# Patient Record
Sex: FEMALE | Race: WHITE | NOT HISPANIC OR LATINO | URBAN - METROPOLITAN AREA
[De-identification: names, ages, dates, MRNs, and addresses within clinical notes are randomized per-mention and may not be internally consistent; named-entity substitution may affect disease eponyms.]

---

## 2017-02-03 ENCOUNTER — IMPORTED ENCOUNTER (OUTPATIENT)
Dept: URBAN - METROPOLITAN AREA CLINIC 31 | Facility: CLINIC | Age: 67
End: 2017-02-03

## 2017-02-03 PROBLEM — H25.813: Noted: 2017-02-03

## 2017-02-03 PROBLEM — H40.053: Noted: 2017-02-03

## 2017-02-03 PROBLEM — H43.813: Noted: 2017-02-03

## 2017-02-03 PROCEDURE — 92014 COMPRE OPH EXAM EST PT 1/>: CPT

## 2017-02-03 PROCEDURE — 92015 DETERMINE REFRACTIVE STATE: CPT

## 2017-02-03 NOTE — PATIENT DISCUSSION
1.  Refractive error Annual 2. Combined Types of Cataract OU: Explained how cataracts can effect vision. Recommend clinical observation. The patient was advised to contact us if any change or worsening of vision. 3. Ocular HTN OU:  Elevated intraocular pressure without signs of glaucomatous damage to the optic nerve. Will continue to monitor. 4. PVD OU:  Patient was cautioned to call our office immediately if they experience a substantial change in their symptoms such as an increase in floaters persistent flashes loss of visual field (may appear as a shadow or a curtain) or decrease in visual acuity as these may indicate a retinal tear or detachment. If this is a new problem patient will need to return for re-examination  as determined by the [address] Return for an appointment in 12 months for comprehensive exam. Pachymetry. with Dr. Sarah Christensen.

## 2018-02-16 ENCOUNTER — IMPORTED ENCOUNTER (OUTPATIENT)
Dept: URBAN - METROPOLITAN AREA CLINIC 31 | Facility: CLINIC | Age: 68
End: 2018-02-16

## 2018-02-16 PROBLEM — H25.813: Noted: 2018-02-16

## 2018-02-16 PROCEDURE — 92014 COMPRE OPH EXAM EST PT 1/>: CPT

## 2018-02-16 NOTE — PATIENT DISCUSSION
1. Combined Types of Cataract OU: Explained how cataracts can effect vision. Recommend clinical observation. The patient was advised to contact us if any change or worsening of vision. 6 mos C eval cats. 2.   Refractive error

## 2018-08-20 ENCOUNTER — IMPORTED ENCOUNTER (OUTPATIENT)
Dept: URBAN - METROPOLITAN AREA CLINIC 31 | Facility: CLINIC | Age: 68
End: 2018-08-20

## 2018-08-20 PROBLEM — H40.053: Noted: 2018-08-20

## 2018-08-20 PROBLEM — H25.813: Noted: 2018-08-20

## 2018-08-20 PROCEDURE — 92250 FUNDUS PHOTOGRAPHY W/I&R: CPT

## 2018-08-20 PROCEDURE — 99214 OFFICE O/P EST MOD 30 MIN: CPT

## 2019-02-22 ENCOUNTER — IMPORTED ENCOUNTER (OUTPATIENT)
Dept: URBAN - METROPOLITAN AREA CLINIC 31 | Facility: CLINIC | Age: 69
End: 2019-02-22

## 2019-02-22 PROBLEM — H25.813: Noted: 2019-02-22

## 2019-02-22 PROCEDURE — 99214 OFFICE O/P EST MOD 30 MIN: CPT

## 2019-02-22 NOTE — PATIENT DISCUSSION
Discussed the risks benefits alternatives and limitations of cataract surgery including infection bleeding loss of vision retinal tears detachment. The patient stated a full understanding and a desire to proceed with the procedure in both eyes. Refractive options were reviewed. Patient has elected to be optimized for distance vision in both eyes. The patient will still need glasses for reading and to possibly fine tune distance vision. Schedule KPE/IOL OS/OD. Ismael Leonard.  DAVID dill

## 2019-03-19 ENCOUNTER — IMPORTED ENCOUNTER (OUTPATIENT)
Dept: URBAN - METROPOLITAN AREA CLINIC 31 | Facility: CLINIC | Age: 69
End: 2019-03-19

## 2019-03-19 PROBLEM — H25.813: Noted: 2019-03-19

## 2019-03-19 PROCEDURE — 76519 ECHO EXAM OF EYE: CPT

## 2019-03-26 ENCOUNTER — IMPORTED ENCOUNTER (OUTPATIENT)
Dept: URBAN - METROPOLITAN AREA CLINIC 31 | Facility: CLINIC | Age: 69
End: 2019-03-26

## 2019-03-26 PROBLEM — Z96.1: Noted: 2019-03-26

## 2019-03-26 PROCEDURE — 99024 POSTOP FOLLOW-UP VISIT: CPT

## 2019-03-26 NOTE — PATIENT DISCUSSION
Post-Op Day #1 - Cataract Surgery Left Eye (OS) - doing well. Tears prn. Continue postop drops as directed. Call office with symptoms of pain redness or decreased vision in operative eye. IOP spike Corneal edema. Possible allergy to Moxifloxacin. Rx Tobramycin QID in replacement of Moxifloxacin. F/U 3 days if vision not cleared or still itching.

## 2019-03-27 ENCOUNTER — IMPORTED ENCOUNTER (OUTPATIENT)
Dept: URBAN - METROPOLITAN AREA CLINIC 31 | Facility: CLINIC | Age: 69
End: 2019-03-27

## 2019-03-27 PROBLEM — Z96.1: Noted: 2019-03-27

## 2019-03-27 PROCEDURE — 99024 POSTOP FOLLOW-UP VISIT: CPT

## 2019-03-27 NOTE — PATIENT DISCUSSION
Pseudophakia OS - Post-Op Day #1 - Cataract Surgery Left Eye (OS) - doing well. Continue po drops as instructed. Call office with symptoms of pain redness or decreased vision left eye.possible mild drop allergy.   CPM.

## 2019-04-02 ENCOUNTER — IMPORTED ENCOUNTER (OUTPATIENT)
Dept: URBAN - METROPOLITAN AREA CLINIC 31 | Facility: CLINIC | Age: 69
End: 2019-04-02

## 2019-04-02 PROBLEM — Z96.1: Noted: 2019-04-02

## 2019-04-02 PROCEDURE — 99024 POSTOP FOLLOW-UP VISIT: CPT

## 2019-04-02 NOTE — PATIENT DISCUSSION
Post-Op Day #1 - Cataract Surgery Right Eye (OD) - doing well. Tears prn. Continue postop drops as directed. Call office with symptoms of pain redness or decreased vision in operative eye. 1 drop zylet instilled. Sample of Cosopt PF to use BID for 3 days.  OD

## 2019-04-02 NOTE — PATIENT DISCUSSION
Pseudophakia OS - Surgery very well healed. Continue to use artificial tears as needed for ocular surface dryness. Finish po drops according to schedule. Final refraction given.

## 2019-04-09 ENCOUNTER — IMPORTED ENCOUNTER (OUTPATIENT)
Dept: URBAN - METROPOLITAN AREA CLINIC 31 | Facility: CLINIC | Age: 69
End: 2019-04-09

## 2019-04-09 PROBLEM — H01.005: Noted: 2019-04-09

## 2019-04-09 PROBLEM — Z96.1: Noted: 2019-04-09

## 2019-04-09 PROBLEM — H01.001: Noted: 2019-04-09

## 2019-04-09 PROBLEM — H01.004: Noted: 2019-04-09

## 2019-04-09 PROBLEM — H01.002: Noted: 2019-04-09

## 2019-04-09 PROCEDURE — 99024 POSTOP FOLLOW-UP VISIT: CPT

## 2019-04-09 PROCEDURE — 92015 DETERMINE REFRACTIVE STATE: CPT

## 2019-04-09 NOTE — PATIENT DISCUSSION
Post-Op Week #2 - Cataract Surgery Left Eye (OS) -  Intraocular lens stable and surgery very well healed. Patient to resume all normal activities. Finish postop drops as directed. Final Refraction given if necessary.

## 2019-04-09 NOTE — PATIENT DISCUSSION
1.  Post-Op Week #1 - Cataract Surgery Right Eye (OD) - Intraocular lens stable and surgery very well healed. Patient to resume all normal activities. Finish postop drops as directed. Final Refraction given if necessary. Call with any problems. 2. Post-Op Week #2 - Cataract Surgery Left Eye (OS) -  Intraocular lens stable and surgery very well healed. Patient to resume all normal activities. Finish postop drops as directed. Final Refraction given if necessary. 3. Pseudophakia OD - Surgery very well healed. Continue to use artificial tears as needed for ocular surface dryness. Finish po drops according to schedule. Final refraction given. 4. Blepharitis anterior type OU - The patient exhibits reddened crusty eyelid margins. Warm compresses and lid scrubs were recommended. Antibiotic yoselin to lid margin qhs. Artificial Tears to be used as needed for discomfort. CE 4 mos.

## 2019-04-09 NOTE — PATIENT DISCUSSION
Blepharitis anterior type OU - The patient exhibits reddened crusty eyelid margins. Warm compresses and lid scrubs were recommended. Antibiotic yoselin to lid margin qhs. Artificial Tears to be used as needed for discomfort.

## 2019-08-09 ENCOUNTER — IMPORTED ENCOUNTER (OUTPATIENT)
Dept: URBAN - METROPOLITAN AREA CLINIC 31 | Facility: CLINIC | Age: 69
End: 2019-08-09

## 2019-08-09 PROBLEM — Z96.1: Noted: 2019-08-09

## 2019-08-09 PROCEDURE — 92014 COMPRE OPH EXAM EST PT 1/>: CPT

## 2019-08-09 PROCEDURE — 92015 DETERMINE REFRACTIVE STATE: CPT

## 2020-08-10 ENCOUNTER — IMPORTED ENCOUNTER (OUTPATIENT)
Dept: URBAN - METROPOLITAN AREA CLINIC 31 | Facility: CLINIC | Age: 70
End: 2020-08-10

## 2020-08-10 PROBLEM — H26.493: Noted: 2020-08-10

## 2020-08-10 PROBLEM — Z96.1: Noted: 2020-08-10

## 2020-08-10 PROCEDURE — 92014 COMPRE OPH EXAM EST PT 1/>: CPT

## 2020-08-10 NOTE — PATIENT DISCUSSION
1.  Pseudophakia OU - IOLs stable. Monitor for changes in vision. 2. PCO OU: (Posterior Capsule Opacification)  Not visually significant at this time. Monitor for yag capsulotomy necessity. 3.   Refractive error Glasses change optional.

## 2020-09-14 ENCOUNTER — OFFICE VISIT (OUTPATIENT)
Dept: URBAN - METROPOLITAN AREA CLINIC 7 | Facility: CLINIC | Age: 70
End: 2020-09-14

## 2020-10-08 ENCOUNTER — OFFICE VISIT (OUTPATIENT)
Dept: URBAN - METROPOLITAN AREA SURGERY CENTER 5 | Facility: SURGERY CENTER | Age: 70
End: 2020-10-08

## 2021-08-16 ENCOUNTER — IMPORTED ENCOUNTER (OUTPATIENT)
Dept: URBAN - METROPOLITAN AREA CLINIC 31 | Facility: CLINIC | Age: 71
End: 2021-08-16

## 2021-08-16 PROBLEM — H26.493: Noted: 2021-08-16

## 2021-08-16 PROBLEM — Z96.1: Noted: 2021-08-16

## 2021-08-16 PROCEDURE — 92015 DETERMINE REFRACTIVE STATE: CPT

## 2021-08-16 PROCEDURE — 92014 COMPRE OPH EXAM EST PT 1/>: CPT

## 2021-08-16 NOTE — PATIENT DISCUSSION
1.  Pseudophakia OU - IOLs stable. Monitor for changes in vision. 2. PCO  OU (Posterior Capsule Opacification)   PCO is visually significant and impairment of vision does not meet the patient’s functional needs or interferes with activities of daily living. Schedule caps eval OS/OD with Dr. Rommel Snyder.

## 2021-08-16 NOTE — PATIENT DISCUSSION
PCO  OU (Posterior Capsule Opacification)   PCO is visually significant and impairment of vision does not meet the patient’s functional needs or interferes with activities of daily living. Risks benefits and alternatives to the Nd:YAG Laser reviewed including elevated IOP immediately postop and retinal tear/detachment. Patient to notify their ophthalmologist promptly if they have a significant change in symptoms such as flashes of light (photopsia) an increase in floaters loss of visual field or decrease in visual acuity after the procedure. Patient will be scheduled in [place] [id number] for Nd:YAG Laser.

## 2021-08-20 ENCOUNTER — IMPORTED ENCOUNTER (OUTPATIENT)
Dept: URBAN - METROPOLITAN AREA CLINIC 31 | Facility: CLINIC | Age: 71
End: 2021-08-20

## 2021-08-20 PROBLEM — H26.493: Noted: 2021-08-20

## 2021-08-20 PROBLEM — H40.013: Noted: 2021-08-20

## 2021-08-20 PROBLEM — H40.012: Noted: 2021-08-20

## 2021-08-20 PROBLEM — Z96.1: Noted: 2021-08-20

## 2021-08-20 PROCEDURE — 99214 OFFICE O/P EST MOD 30 MIN: CPT

## 2021-08-20 NOTE — PATIENT DISCUSSION
1.  PCO  OU (Posterior Capsule Opacification)   PCO is visually significant and impairment of vision does not meet the patient’s functional needs or interferes with activities of daily living. Risks benefits and alternatives to the Nd:YAG Laser reviewed including elevated IOP immediately postop and retinal tear/detachment. Patient to notify their ophthalmologist promptly if they have a significant change in symptoms such as flashes of light (photopsia) an increase in floaters loss of visual field or decrease in visual acuity after the procedure. Patient will be scheduled in [place] [id number] for Nd:YAG Laser. SCHEDULE YAG CAPS  OS/ODLFA to follow at 1 week2. Pseudophakia OU - IOLs stable. Monitor for changes in vision. 3.   Glaucoma suspect OS - asymmetric increased c/d ratio  LFA to follow with VF and OCT

## 2021-09-16 ENCOUNTER — IMPORTED ENCOUNTER (OUTPATIENT)
Dept: URBAN - METROPOLITAN AREA CLINIC 31 | Facility: CLINIC | Age: 71
End: 2021-09-16

## 2021-09-16 PROBLEM — Z98.89: Noted: 2021-09-16

## 2021-09-16 PROCEDURE — 99024 POSTOP FOLLOW-UP VISIT: CPT

## 2021-09-16 NOTE — PATIENT DISCUSSION
s/p YAG laser capsulotomy for Posterior Capsule Opacification (PCO) in Both Eyes (OU). Doing better. Please contact us if you have a significant change in symptoms such as flashes of light (photopsia) increased floaters decrease/loss of visual field or visual acuity. Remnant caps/strands. Get OCT F/U here to go over.

## 2021-09-27 ENCOUNTER — IMPORTED ENCOUNTER (OUTPATIENT)
Dept: URBAN - METROPOLITAN AREA CLINIC 31 | Facility: CLINIC | Age: 71
End: 2021-09-27

## 2021-09-27 PROCEDURE — 92133 CPTRZD OPH DX IMG PST SGM ON: CPT

## 2021-10-11 PROBLEM — H40.013: Noted: 2021-10-11

## 2022-01-17 ENCOUNTER — IMPORTED ENCOUNTER (OUTPATIENT)
Dept: URBAN - METROPOLITAN AREA CLINIC 31 | Facility: CLINIC | Age: 72
End: 2022-01-17

## 2022-01-17 PROBLEM — H40.013: Noted: 2022-01-17

## 2022-01-17 PROBLEM — H40.012: Noted: 2022-01-17

## 2022-01-17 PROBLEM — Z96.1: Noted: 2022-01-17

## 2022-01-17 PROCEDURE — 99213 OFFICE O/P EST LOW 20 MIN: CPT

## 2022-01-17 NOTE — PATIENT DISCUSSION
Glaucoma suspect OS - No signs of glaucoma starting based on todays examination and testing. Will continue to monitor for development. this admission

## 2022-04-02 ASSESSMENT — VISUAL ACUITY
OS_GLARE: 20/100MED
OD_CC: 20/25-2
OS_PH: SC 20/40 -2
OD_CC: J113''
OS_PH: CC 20/50 +2
OS_SC: 20/200
OS_GLARE: 20/200MED
OS_CC: 20/25-2
OD_SC: 20/40-1
OS_SC: 20/30
OD_SC: 20/25
OS_PH: CC 20/40
OD_SC: 20/20-1
OD_SC: 20/25-2
OS_SC: 20/20
OD_SC: 20/60-2
OS_CC: 20/80
OS_CC: 20/40+2
OS_PH: CC 20/40
OD_SC: 20/20-2
OS_SC: 20/30-2
OS_CC: 20/30-2
OD_PH: CC 20/40 +2
OD_CC: 20/30-3
OD_PH: CC 20/30
OS_PH: SC 20/30 -3
OS_SC: 20/200
OS_CC: 20/40+2
OS_SC: 20/25+2
OS_SC: 20/100
OD_SC: 20/50-3
OS_GLARE: 20/40MED
OS_PH: SC 20/30 -3
OD_GLARE: 20/100MED
OS_SC: 20/20-1
OD_SC: 20/20
OD_CC: 20/20
OS_PH: CC 20/40 -2
OD_SC: 20/25-3
OD_GLARE: 20/30+2MED
OD_GLARE: 20/50MED
OS_SC: 20/60-1
OD_CC: J115''
OS_CC: 20/40-3
OD_CC: 20/25
OS_CC: J115''
OS_CC: J113''

## 2022-04-02 ASSESSMENT — TONOMETRY
OS_IOP_MMHG: 18
OD_IOP_MMHG: 13
OS_IOP_MMHG: 14
OS_IOP_MMHG: 27
OS_IOP_MMHG: 22
OD_IOP_MMHG: 17
OS_IOP_MMHG: 17
OD_IOP_MMHG: 17
OS_IOP_MMHG: 30
OD_IOP_MMHG: 24
OD_IOP_MMHG: 19
OD_IOP_MMHG: 14
OS_IOP_MMHG: 15
OS_IOP_MMHG: 19
OS_IOP_MMHG: 15
OS_IOP_MMHG: 12
OD_IOP_MMHG: 15
OD_IOP_MMHG: 15
OS_IOP_MMHG: 20
OD_IOP_MMHG: 14
OD_IOP_MMHG: 34
OS_IOP_MMHG: 13
OS_IOP_MMHG: 14
OD_IOP_MMHG: 18
OD_IOP_MMHG: 17
OS_IOP_MMHG: 19

## 2022-07-30 ENCOUNTER — TELEPHONE ENCOUNTER (OUTPATIENT)
Age: 72
End: 2022-07-30

## 2022-07-31 ENCOUNTER — TELEPHONE ENCOUNTER (OUTPATIENT)
Age: 72
End: 2022-07-31

## 2022-08-15 ENCOUNTER — ESTABLISHED PATIENT (OUTPATIENT)
Dept: URBAN - METROPOLITAN AREA CLINIC 31 | Facility: CLINIC | Age: 72
End: 2022-08-15

## 2022-08-15 DIAGNOSIS — H40.013: ICD-10-CM

## 2022-08-15 DIAGNOSIS — H52.4: ICD-10-CM

## 2022-08-15 PROCEDURE — 92250 FUNDUS PHOTOGRAPHY W/I&R: CPT

## 2022-08-15 PROCEDURE — 92014 COMPRE OPH EXAM EST PT 1/>: CPT

## 2022-08-15 PROCEDURE — 92015 DETERMINE REFRACTIVE STATE: CPT

## 2022-08-15 ASSESSMENT — VISUAL ACUITY
OS_CC: J1+
OS_SC: 20/30
OD_SC: 20/20
OD_CC: J1+

## 2022-08-15 ASSESSMENT — TONOMETRY
OD_IOP_MMHG: 15
OS_IOP_MMHG: 15

## 2023-09-07 ENCOUNTER — COMPREHENSIVE EXAM (OUTPATIENT)
Dept: URBAN - METROPOLITAN AREA CLINIC 31 | Facility: CLINIC | Age: 73
End: 2023-09-07

## 2023-09-07 DIAGNOSIS — Z98.890: ICD-10-CM

## 2023-09-07 DIAGNOSIS — H52.4: ICD-10-CM

## 2023-09-07 DIAGNOSIS — H40.013: ICD-10-CM

## 2023-09-07 PROCEDURE — 92014 COMPRE OPH EXAM EST PT 1/>: CPT

## 2023-09-07 PROCEDURE — 92250E RETINAL SCREENING, ELECTIVE

## 2023-09-07 PROCEDURE — 92015 DETERMINE REFRACTIVE STATE: CPT

## 2023-09-07 ASSESSMENT — TONOMETRY
OS_IOP_MMHG: 13
OD_IOP_MMHG: 15

## 2023-09-07 ASSESSMENT — VISUAL ACUITY
OD_CC: 20/25
OS_CC: 20/40-1
OS_PH: 20/25
OU_CC: J1+

## 2023-10-16 ENCOUNTER — ESTABLISHED PATIENT (OUTPATIENT)
Dept: URBAN - METROPOLITAN AREA CLINIC 31 | Facility: CLINIC | Age: 73
End: 2023-10-16

## 2023-10-16 DIAGNOSIS — H10.023: ICD-10-CM

## 2023-10-16 PROCEDURE — 99213 OFFICE O/P EST LOW 20 MIN: CPT

## 2023-10-16 ASSESSMENT — VISUAL ACUITY
OD_CC: 20/30
OS_CC: 20/30
OD_CC: J1
OS_CC: J1

## 2023-10-23 ENCOUNTER — ESTABLISHED PATIENT (OUTPATIENT)
Dept: URBAN - METROPOLITAN AREA CLINIC 31 | Facility: CLINIC | Age: 73
End: 2023-10-23

## 2023-10-23 DIAGNOSIS — H10.023: ICD-10-CM

## 2023-10-23 PROCEDURE — 99213 OFFICE O/P EST LOW 20 MIN: CPT

## 2023-10-23 ASSESSMENT — VISUAL ACUITY
OS_CC: 20/60
OS_PH: 20/40
OD_CC: 20/25

## 2023-10-24 ENCOUNTER — EMERGENCY VISIT (OUTPATIENT)
Dept: URBAN - METROPOLITAN AREA CLINIC 31 | Facility: CLINIC | Age: 73
End: 2023-10-24

## 2023-10-24 DIAGNOSIS — H10.13: ICD-10-CM

## 2023-10-24 PROCEDURE — 99213 OFFICE O/P EST LOW 20 MIN: CPT

## 2023-10-24 ASSESSMENT — VISUAL ACUITY
OS_SC: 20/40
OD_SC: 20/25

## 2023-10-31 ENCOUNTER — FOLLOW UP (OUTPATIENT)
Dept: URBAN - METROPOLITAN AREA CLINIC 31 | Facility: CLINIC | Age: 73
End: 2023-10-31

## 2023-10-31 DIAGNOSIS — H10.13: ICD-10-CM

## 2023-10-31 PROCEDURE — 99213 OFFICE O/P EST LOW 20 MIN: CPT

## 2023-10-31 ASSESSMENT — VISUAL ACUITY
OD_CC: 20/30-1
OD_PH: 20/25
OS_CC: 20/40

## 2024-01-03 ENCOUNTER — ESTABLISHED PATIENT (OUTPATIENT)
Dept: URBAN - METROPOLITAN AREA CLINIC 31 | Facility: CLINIC | Age: 74
End: 2024-01-03

## 2024-01-03 DIAGNOSIS — H10.13: ICD-10-CM

## 2024-01-03 PROCEDURE — 99213 OFFICE O/P EST LOW 20 MIN: CPT

## 2024-01-03 ASSESSMENT — VISUAL ACUITY
OS_PH: 20/30
OS_SC: 20/40
OD_SC: 20/25

## 2024-01-03 ASSESSMENT — TONOMETRY: OS_IOP_MMHG: 16

## 2024-01-10 ENCOUNTER — ESTABLISHED PATIENT (OUTPATIENT)
Dept: URBAN - METROPOLITAN AREA CLINIC 31 | Facility: CLINIC | Age: 74
End: 2024-01-10

## 2024-01-10 DIAGNOSIS — H10.13: ICD-10-CM

## 2024-01-10 PROCEDURE — 99213 OFFICE O/P EST LOW 20 MIN: CPT

## 2024-01-10 ASSESSMENT — VISUAL ACUITY
OU_SC: 20/20
OS_SC: 20/40
OS_PH: 20/40
OD_SC: 20/20

## 2024-01-10 ASSESSMENT — TONOMETRY: OS_IOP_MMHG: 15

## 2024-08-15 ENCOUNTER — EMERGENCY VISIT (OUTPATIENT)
Dept: URBAN - METROPOLITAN AREA CLINIC 31 | Facility: CLINIC | Age: 74
End: 2024-08-15

## 2024-08-15 DIAGNOSIS — H00.14: ICD-10-CM

## 2024-08-15 DIAGNOSIS — H00.036: ICD-10-CM

## 2024-08-15 PROCEDURE — 99213 OFFICE O/P EST LOW 20 MIN: CPT

## 2024-08-15 RX ORDER — AZITHROMYCIN DIHYDRATE 250 MG/1: 1 TABLET, FILM COATED ORAL ONCE A DAY

## 2024-08-15 RX ORDER — POLYMYXIN B SULFATE AND TRIMETHOPRIM 1; 10000 MG/ML; [USP'U]/ML: 1 SOLUTION OPHTHALMIC

## 2024-08-15 ASSESSMENT — VISUAL ACUITY
OD_SC: 20/25
OS_SC: 20/50
OS_PH: 20/30

## 2024-08-19 ENCOUNTER — FOLLOW UP (OUTPATIENT)
Dept: URBAN - METROPOLITAN AREA CLINIC 31 | Facility: CLINIC | Age: 74
End: 2024-08-19

## 2024-08-19 DIAGNOSIS — H00.14: ICD-10-CM

## 2024-08-19 DIAGNOSIS — H00.036: ICD-10-CM

## 2024-08-19 PROCEDURE — 99213 OFFICE O/P EST LOW 20 MIN: CPT

## 2024-08-19 ASSESSMENT — VISUAL ACUITY
OS_PH: 20/30
OU_SC: 20/20
OS_SC: 20/40
OD_SC: 20/20

## 2024-08-19 ASSESSMENT — TONOMETRY
OD_IOP_MMHG: 16
OS_IOP_MMHG: 17

## 2024-08-27 ENCOUNTER — FOLLOW UP (OUTPATIENT)
Dept: URBAN - METROPOLITAN AREA CLINIC 29 | Facility: CLINIC | Age: 74
End: 2024-08-27

## 2024-08-27 DIAGNOSIS — H00.14: ICD-10-CM

## 2024-08-27 DIAGNOSIS — H00.036: ICD-10-CM

## 2024-08-27 DIAGNOSIS — H40.1131: ICD-10-CM

## 2024-08-27 PROCEDURE — 92014 COMPRE OPH EXAM EST PT 1/>: CPT

## 2024-08-27 PROCEDURE — 92133 CPTRZD OPH DX IMG PST SGM ON: CPT

## 2024-08-27 ASSESSMENT — VISUAL ACUITY
OS_SC: 20/200
OS_PH: 20/50+2
OD_SC: 20/20

## 2024-09-09 ENCOUNTER — COMPREHENSIVE EXAM (OUTPATIENT)
Dept: URBAN - METROPOLITAN AREA CLINIC 31 | Facility: CLINIC | Age: 74
End: 2024-09-09

## 2024-09-09 DIAGNOSIS — H40.1131: ICD-10-CM

## 2024-09-09 PROCEDURE — 92014 COMPRE OPH EXAM EST PT 1/>: CPT

## 2024-09-09 PROCEDURE — 92250 FUNDUS PHOTOGRAPHY W/I&R: CPT

## 2024-09-09 RX ORDER — LATANOPROST 50 UG/ML: 1 SOLUTION/ DROPS OPHTHALMIC EVERY EVENING

## 2024-09-12 ENCOUNTER — FOLLOW UP (OUTPATIENT)
Dept: URBAN - METROPOLITAN AREA CLINIC 31 | Facility: CLINIC | Age: 74
End: 2024-09-12

## 2024-09-12 DIAGNOSIS — H40.1131: ICD-10-CM

## 2024-09-12 DIAGNOSIS — H00.14: ICD-10-CM

## 2024-09-12 PROCEDURE — 99213 OFFICE O/P EST LOW 20 MIN: CPT

## 2024-09-12 RX ORDER — TIMOLOL MALEATE OPHTHALMIC GEL FORMING SOLUTION, 0.25% 2.5 MG/ML: 1 SOLUTION/ DROPS OPHTHALMIC EVERY MORNING

## 2024-10-21 ENCOUNTER — FOLLOW UP (OUTPATIENT)
Dept: URBAN - METROPOLITAN AREA CLINIC 31 | Facility: CLINIC | Age: 74
End: 2024-10-21

## 2024-10-21 DIAGNOSIS — H04.123: ICD-10-CM

## 2024-10-21 DIAGNOSIS — H40.1131: ICD-10-CM

## 2024-10-21 PROCEDURE — 99213 OFFICE O/P EST LOW 20 MIN: CPT

## 2024-10-21 PROCEDURE — 92083 EXTENDED VISUAL FIELD XM: CPT
